# Patient Record
Sex: FEMALE | Race: BLACK OR AFRICAN AMERICAN | NOT HISPANIC OR LATINO | Employment: FULL TIME | URBAN - METROPOLITAN AREA
[De-identification: names, ages, dates, MRNs, and addresses within clinical notes are randomized per-mention and may not be internally consistent; named-entity substitution may affect disease eponyms.]

---

## 2023-10-19 ENCOUNTER — OFFICE VISIT (OUTPATIENT)
Dept: OBSTETRICS AND GYNECOLOGY | Age: 33
End: 2023-10-19
Payer: COMMERCIAL

## 2023-10-19 VITALS
BODY MASS INDEX: 30.91 KG/M2 | DIASTOLIC BLOOD PRESSURE: 70 MMHG | WEIGHT: 168 LBS | SYSTOLIC BLOOD PRESSURE: 110 MMHG | HEIGHT: 62 IN

## 2023-10-19 DIAGNOSIS — Z01.419 WELL WOMAN EXAM WITH ROUTINE GYNECOLOGICAL EXAM: Primary | ICD-10-CM

## 2023-10-19 DIAGNOSIS — Z12.4 SCREENING FOR CERVICAL CANCER: ICD-10-CM

## 2023-10-19 DIAGNOSIS — Z30.41 ORAL CONTRACEPTIVE PILL SURVEILLANCE: ICD-10-CM

## 2023-10-19 RX ORDER — NORETHINDRONE ACETATE AND ETHINYL ESTRADIOL 1.5-30(21)
1 KIT ORAL DAILY
Qty: 84 TABLET | Refills: 4 | Status: SHIPPED | OUTPATIENT
Start: 2023-10-19 | End: 2024-10-18

## 2023-10-19 NOTE — PROGRESS NOTES
Subjective     Chief Complaint   Patient presents with    Gynecologic Exam     New gyn, Annual, last pap 2022 normal  CC:  no problems       History of Present Illness    Vee Rodriguez is a 32 y.o.  who presents for annual exam.    New GYN  OCP's for contraception   Her menses are regular every 28-30 days, lasting 4-7 days, dysmenorrhea none   Soc - moved to [place] this year from [place]. Just needed change. 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  No other GYN concerns or complaints today    Obstetric History:  OB History          1    Para        Term                AB   1    Living             SAB        IAB   1    Ectopic        Molar        Multiple        Live Births                   Menstrual History:     Patient's last menstrual period was 10/05/2023.         Current contraception: OCP (estrogen/progesterone)  History of abnormal Pap smear: no  Received Gardasil immunization: no  Perform regular self breast exam: no  Family history of uterine or ovarian cancer: no  Family History of colon cancer: no  Family history of breast cancer: no    Mammogram: not indicated.  Colonoscopy: not indicated.  DEXA: not indicated.    Exercise: moderately active - weights, cardio   Calcium/Vitamin D: adequate intake    The following portions of the patient's history were reviewed and updated as appropriate: allergies, current medications, past family history, past medical history, past social history, past surgical history, and problem list.    Review of Systems   Constitutional: Negative.    Respiratory: Negative.     Cardiovascular: Negative.    Gastrointestinal: Negative.    Genitourinary: Negative.    Skin: Negative.    Psychiatric/Behavioral: Negative.             Objective   Physical Exam  Constitutional:       General: She is awake.      Appearance: Normal appearance. She is well-developed.   HENT:      Head: Normocephalic and atraumatic.      Nose: Nose normal.   Neck:       Thyroid: No thyroid mass, thyromegaly or thyroid tenderness.   Cardiovascular:      Rate and Rhythm: Normal rate and regular rhythm.      Pulses: Normal pulses.      Heart sounds: Normal heart sounds.   Pulmonary:      Effort: Pulmonary effort is normal.      Breath sounds: Normal breath sounds.   Chest:   Breasts:     Breasts are symmetrical.      Right: Normal. No swelling, bleeding, inverted nipple, mass, nipple discharge, skin change or tenderness.      Left: Normal. No swelling, bleeding, inverted nipple, mass, nipple discharge, skin change or tenderness.   Abdominal:      General: Abdomen is flat. Bowel sounds are normal.      Palpations: Abdomen is soft.      Tenderness: There is no abdominal tenderness.   Genitourinary:     General: Normal vulva.      Labia:         Right: No rash, tenderness, lesion or injury.         Left: No rash, tenderness, lesion or injury.       Urethra: No prolapse, urethral pain, urethral swelling or urethral lesion.      Vagina: Normal. No signs of injury. No vaginal discharge, erythema, tenderness, bleeding, lesions or prolapsed vaginal walls.      Cervix: Normal. No discharge, friability, lesion, erythema or cervical bleeding.      Uterus: Normal. Not enlarged, not tender and no uterine prolapse.       Adnexa: Right adnexa normal and left adnexa normal.        Right: No mass, tenderness or fullness.          Left: No mass, tenderness or fullness.        Rectum: Normal. No mass.   Musculoskeletal:      Cervical back: Normal range of motion and neck supple.   Lymphadenopathy:      Upper Body:      Right upper body: No supraclavicular adenopathy.      Left upper body: No supraclavicular adenopathy.   Skin:     General: Skin is warm and dry.   Neurological:      General: No focal deficit present.      Mental Status: She is alert and oriented to person, place, and time.   Psychiatric:         Mood and Affect: Mood normal.         Behavior: Behavior normal. Behavior is cooperative.     "     Thought Content: Thought content normal.         Judgment: Judgment normal.         /70   Ht 157.5 cm (62\")   Wt 76.2 kg (168 lb)   LMP 10/05/2023   BMI 30.73 kg/m²     Assessment & Plan   Diagnoses and all orders for this visit:    1. Well woman exam with routine gynecological exam (Primary)    2. Screening for cervical cancer  -     IGP, Apt HPV,rfx 16 / 18,45    3. Oral contraceptive pill surveillance  -     norethindrone-ethinyl estradiol-iron (Junel FE 1.5/30) 1.5-30 MG-MCG tablet; Take 1 tablet by mouth Daily.  Dispense: 84 tablet; Refill: 4        All questions answered.  Breast self exam technique reviewed and patient encouraged to perform self-exam monthly.  Discussed healthy lifestyle modifications.  Recommended 30 minutes of aerobic exercise five times per week.  Discussed calcium needs to prevent osteoporosis.    -Pap done  -OCP's refilled  -F/u 1 year               "

## 2023-10-23 LAB
CYTOLOGIST CVX/VAG CYTO: NORMAL
CYTOLOGY CVX/VAG DOC CYTO: NORMAL
CYTOLOGY CVX/VAG DOC THIN PREP: NORMAL
DX ICD CODE: NORMAL
HIV 1 & 2 AB SER-IMP: NORMAL
HPV I/H RISK 4 DNA CVX QL PROBE+SIG AMP: NEGATIVE
OTHER STN SPEC: NORMAL
STAT OF ADQ CVX/VAG CYTO-IMP: NORMAL

## 2024-11-15 ENCOUNTER — OFFICE VISIT (OUTPATIENT)
Dept: OBSTETRICS AND GYNECOLOGY | Age: 34
End: 2024-11-15
Payer: COMMERCIAL

## 2024-11-15 VITALS
BODY MASS INDEX: 30.91 KG/M2 | WEIGHT: 168 LBS | DIASTOLIC BLOOD PRESSURE: 64 MMHG | HEIGHT: 62 IN | SYSTOLIC BLOOD PRESSURE: 116 MMHG

## 2024-11-15 DIAGNOSIS — Z12.4 SCREENING FOR MALIGNANT NEOPLASM OF CERVIX: ICD-10-CM

## 2024-11-15 DIAGNOSIS — Z30.41 ORAL CONTRACEPTIVE PILL SURVEILLANCE: ICD-10-CM

## 2024-11-15 DIAGNOSIS — Z01.419 WELL FEMALE EXAM WITH ROUTINE GYNECOLOGICAL EXAM: Primary | ICD-10-CM

## 2024-11-15 DIAGNOSIS — Z11.51 SCREENING FOR HUMAN PAPILLOMAVIRUS (HPV): ICD-10-CM

## 2024-11-15 RX ORDER — NORETHINDRONE ACETATE AND ETHINYL ESTRADIOL 1.5-30(21)
1 KIT ORAL DAILY
Qty: 84 TABLET | Refills: 3 | Status: SHIPPED | OUTPATIENT
Start: 2024-11-15 | End: 2025-11-15

## 2024-11-15 RX ORDER — SERTRALINE HYDROCHLORIDE 100 MG/1
100 TABLET, FILM COATED ORAL
COMMUNITY
Start: 2024-11-11

## 2024-11-15 NOTE — PROGRESS NOTES
"Subjective     History of Present Illness    Chief Complaint   Patient presents with    Gynecologic Exam     AE Lst pap (-) Hpv (-) 10/19/23  Pt has no complaints today        Vee Rodriguez is a 33 y.o. female who presents for annual exam.  Menses are regular every 28-30 days, lasting 4-7 days, dysmenorrhea none     Doing well, no complaints.   On OCPs, likes these and requests refills. Regular menses.    Pap smear last year was normal/negative. No hx abnormal pap smear. She would like to continue with yearly pap smears for now.   Declines STD testing.   Social - Had a [place] wedding a few months ago and is having a big wedding in [place] in April. 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    Relevant data reviewed:  IGP, Apt HPV,rfx 16 / 18,45 (10/19/2023 09:56)     Obstetric History:  OB History          1    Para        Term                AB   1    Living             SAB        IAB   1    Ectopic        Molar        Multiple        Live Births                   Menstrual History:     Patient's last menstrual period was 2024 (exact date).           Current contraception: OCP (estrogen/progesterone)  History of abnormal Pap smear: no  Received Gardasil immunization: no  Perform regular self breast exam: no  Family history of uterine or ovarian cancer: no  Family History of colon cancer: no  Family history of breast cancer: no    PAP: done today  Mammogram: not indicated  Colonoscopy: not indicated  DEXA: not indicated    Exercise: moderately active  Calcium/Vitamin D: adequate intake    The following portions of the patient's history were reviewed and updated as appropriate: allergies, current medications, past family history, past medical history, past social history, past surgical history, and problem list.    Review of Systems  A comprehensive review of systems was negative.       Objective   Physical Exam    /64   Ht 157.5 cm (62.01\")   Wt 76.2 kg (168 lb)   LMP 2024 " (Exact Date)   BMI 30.72 kg/m²      General: alert, appears stated age, cooperative, and no distress   Heart: regular rate and rhythm, S1, S2 normal, no murmur, click, rub or gallop   Lungs: clear to auscultation bilaterally   Abdomen: soft, non-tender, without masses or organomegaly   Breast: inspection negative, no nipple discharge or bleeding, no masses or nodularity palpable   External genitalia/Vulva: External genitalia including bartholin's glands, Urethra, Hooper's gland and urethra meatus are normal and Bladder appears normal without significant prolapse    Vagina: normal mucosa, normal discharge   Cervix: no lesions   Uterus: normal size and non-tender   Adnexa: normal adnexa and no mass, fullness, tenderness   Neurologic: Alert and Oriented x3   Psychiatric: Normal affect, judgement, and mood       Assessment & Plan   Diagnoses and all orders for this visit:    1. Well female exam with routine gynecological exam (Primary)  -     IGP, Apt HPV,rfx 16 / 18,45    2. Screening for human papillomavirus (HPV)  -     IGP, Apt HPV,rfx 16 / 18,45    3. Screening for malignant neoplasm of cervix  -     IGP, Apt HPV,rfx 16 / 18,45    4. Oral contraceptive pill surveillance  -     norethindrone-ethinyl estradiol-iron (Junel FE 1.5/30) 1.5-30 MG-MCG tablet; Take 1 tablet by mouth Daily.  Dispense: 84 tablet; Refill: 3          PAP smear with HPV co-testing completed today  OCP refills sent  Will call patient with results and treat accordingly.   All questions answered.  Breast self exam technique reviewed and patient encouraged to perform self-exam monthly.  Physical activity and regular exercise encouraged.  Discussed healthy lifestyle modifications.  Discussed calcium and vitamin D needs to prevent osteoporosis.      Return in 1 year (on 11/15/2025) for Annual exam.

## 2024-11-24 LAB
CYTOLOGIST CVX/VAG CYTO: NORMAL
CYTOLOGY CVX/VAG DOC CYTO: NORMAL
CYTOLOGY CVX/VAG DOC THIN PREP: NORMAL
DX ICD CODE: NORMAL
HPV I/H RISK 4 DNA CVX QL PROBE+SIG AMP: NEGATIVE
Lab: NORMAL
OTHER STN SPEC: NORMAL
STAT OF ADQ CVX/VAG CYTO-IMP: NORMAL

## 2025-01-31 ENCOUNTER — LAB (OUTPATIENT)
Dept: OTHER | Facility: HOSPITAL | Age: 35
End: 2025-01-31
Payer: COMMERCIAL

## 2025-01-31 ENCOUNTER — CONSULT (OUTPATIENT)
Dept: ONCOLOGY | Facility: CLINIC | Age: 35
End: 2025-01-31
Payer: COMMERCIAL

## 2025-01-31 ENCOUNTER — PRIOR AUTHORIZATION (OUTPATIENT)
Dept: ONCOLOGY | Facility: CLINIC | Age: 35
End: 2025-01-31
Payer: COMMERCIAL

## 2025-01-31 ENCOUNTER — TELEPHONE (OUTPATIENT)
Dept: ONCOLOGY | Facility: CLINIC | Age: 35
End: 2025-01-31

## 2025-01-31 VITALS
HEIGHT: 62 IN | DIASTOLIC BLOOD PRESSURE: 78 MMHG | SYSTOLIC BLOOD PRESSURE: 115 MMHG | TEMPERATURE: 98 F | HEART RATE: 67 BPM | BODY MASS INDEX: 30.46 KG/M2 | WEIGHT: 165.5 LBS | RESPIRATION RATE: 16 BRPM | OXYGEN SATURATION: 98 %

## 2025-01-31 DIAGNOSIS — D75.839 THROMBOCYTOSIS: Primary | ICD-10-CM

## 2025-01-31 DIAGNOSIS — R71.8 MICROCYTOSIS: ICD-10-CM

## 2025-01-31 LAB
BASOPHILS # BLD AUTO: 0.04 10*3/MM3 (ref 0–0.2)
BASOPHILS NFR BLD AUTO: 0.6 % (ref 0–1.5)
DEPRECATED RDW RBC AUTO: 36.8 FL (ref 37–54)
EOSINOPHIL # BLD AUTO: 0.07 10*3/MM3 (ref 0–0.4)
EOSINOPHIL NFR BLD AUTO: 1.1 % (ref 0.3–6.2)
ERYTHROCYTE [DISTWIDTH] IN BLOOD BY AUTOMATED COUNT: 14.8 % (ref 12.3–15.4)
FERRITIN SERPL-MCNC: 72.1 NG/ML (ref 13–150)
HCT VFR BLD AUTO: 35.1 % (ref 34–46.6)
HGB BLD-MCNC: 11.5 G/DL (ref 12–15.9)
HGB RETIC QN AUTO: 25 PG (ref 29.8–36.1)
IMM GRANULOCYTES # BLD AUTO: 0.05 10*3/MM3 (ref 0–0.05)
IMM GRANULOCYTES NFR BLD AUTO: 0.8 % (ref 0–0.5)
IMM RETICS NFR: 15.1 % (ref 3–15.8)
IRON 24H UR-MRATE: 119 MCG/DL (ref 37–145)
IRON SATN MFR SERPL: 28 % (ref 20–50)
LYMPHOCYTES # BLD AUTO: 2.98 10*3/MM3 (ref 0.7–3.1)
LYMPHOCYTES NFR BLD AUTO: 44.9 % (ref 19.6–45.3)
MCH RBC QN AUTO: 23.2 PG (ref 26.6–33)
MCHC RBC AUTO-ENTMCNC: 32.8 G/DL (ref 31.5–35.7)
MCV RBC AUTO: 70.8 FL (ref 79–97)
MONOCYTES # BLD AUTO: 0.62 10*3/MM3 (ref 0.1–0.9)
MONOCYTES NFR BLD AUTO: 9.4 % (ref 5–12)
NEUTROPHILS NFR BLD AUTO: 2.87 10*3/MM3 (ref 1.7–7)
NEUTROPHILS NFR BLD AUTO: 43.2 % (ref 42.7–76)
NRBC BLD AUTO-RTO: 0 /100 WBC (ref 0–0.2)
PLAT MORPH BLD: NORMAL
PLATELET # BLD AUTO: 435 10*3/MM3 (ref 140–450)
PMV BLD AUTO: 8.5 FL (ref 6–12)
RBC # BLD AUTO: 4.96 10*6/MM3 (ref 3.77–5.28)
RBC MORPH BLD: NORMAL
RETICS # AUTO: 0.09 10*6/MM3 (ref 0.02–0.13)
RETICS/RBC NFR AUTO: 1.72 % (ref 0.7–1.9)
TIBC SERPL-MCNC: 432 MCG/DL (ref 298–536)
TRANSFERRIN SERPL-MCNC: 290 MG/DL (ref 200–360)
WBC MORPH BLD: NORMAL
WBC NRBC COR # BLD AUTO: 6.63 10*3/MM3 (ref 3.4–10.8)

## 2025-01-31 PROCEDURE — 82525 ASSAY OF COPPER: CPT | Performed by: INTERNAL MEDICINE

## 2025-01-31 PROCEDURE — 83540 ASSAY OF IRON: CPT | Performed by: INTERNAL MEDICINE

## 2025-01-31 PROCEDURE — 84466 ASSAY OF TRANSFERRIN: CPT | Performed by: INTERNAL MEDICINE

## 2025-01-31 PROCEDURE — 36415 COLL VENOUS BLD VENIPUNCTURE: CPT

## 2025-01-31 PROCEDURE — 83020 HEMOGLOBIN ELECTROPHORESIS: CPT | Performed by: INTERNAL MEDICINE

## 2025-01-31 PROCEDURE — 82728 ASSAY OF FERRITIN: CPT | Performed by: INTERNAL MEDICINE

## 2025-01-31 PROCEDURE — 85046 RETICYTE/HGB CONCENTRATE: CPT | Performed by: INTERNAL MEDICINE

## 2025-01-31 PROCEDURE — 85025 COMPLETE CBC W/AUTO DIFF WBC: CPT | Performed by: INTERNAL MEDICINE

## 2025-01-31 PROCEDURE — 81257 HBA1/HBA2 GENE: CPT | Performed by: INTERNAL MEDICINE

## 2025-01-31 PROCEDURE — 85007 BL SMEAR W/DIFF WBC COUNT: CPT | Performed by: INTERNAL MEDICINE

## 2025-01-31 PROCEDURE — 84630 ASSAY OF ZINC: CPT | Performed by: INTERNAL MEDICINE

## 2025-01-31 PROCEDURE — 83655 ASSAY OF LEAD: CPT | Performed by: INTERNAL MEDICINE

## 2025-01-31 RX ORDER — SPIRONOLACTONE 50 MG/1
1 TABLET, FILM COATED ORAL EVERY 12 HOURS SCHEDULED
COMMUNITY
Start: 2024-12-03

## 2025-01-31 RX ORDER — ADAPALENE GEL USP, 0.3% 3 MG/G
GEL TOPICAL
COMMUNITY
Start: 2024-12-05

## 2025-01-31 NOTE — PROGRESS NOTES
.     REASON FOR CONSULTATION:   Thrombocytosis  Provide an opinion on any further workup or treatment                             REQUESTING PHYSICIAN: MIKE Jeffers  RECORDS OBTAINED:  Records of the patient's history including those obtained from the referring provider were reviewed and summarized in detail.    HISTORY OF PRESENT ILLNESS:  The patient is a 34 y.o. year old female  who is here for follow-up with the above-mentioned history.    Reviewed records from PCP requesting an appointment due to elevated PLT.  10/14/24: WBC 6.1, Hb 11.8 (normal range 11.1-15.9), , MCV 73.  7/12/2024: Hb 11.4.  Normal range 11.1-15.9.  MCV 72.   (normal range 150-450).    7/22/2024: Iron saturation 33%.  No ferritin.    1/31/2025: Hb 11.5.  MCV 70.8.  .    States she took oral iron in the past when she donated blood frequently but did not have any follow-up labs to see if this helped.  Stopped donating blood and stopped the oral iron on her own after a while.  No GI side effects from the iron.    Menstruates regularly.  No bleeding otherwise.    Past Medical History:   Diagnosis Date    Anxiety     History of acne      No past surgical history on file.    MEDICATIONS    Current Outpatient Medications:     adapalene (DIFFERIN) 0.3 % gel, APPLY A PEA SIZED AMOUNT TO THE ENTIRE FACE AT BEDTIME, Disp: , Rfl:     norethindrone-ethinyl estradiol-iron (Junel FE 1.5/30) 1.5-30 MG-MCG tablet, Take 1 tablet by mouth Daily., Disp: 84 tablet, Rfl: 3    sertraline (ZOLOFT) 100 MG tablet, 1 tablet., Disp: , Rfl:     spironolactone (ALDACTONE) 50 MG tablet, Take 1 tablet by mouth Every 12 (Twelve) Hours., Disp: , Rfl:     ALLERGIES:     Allergies   Allergen Reactions    Predicort [Prednisolone] Hives       SOCIAL HISTORY:       Social History     Socioeconomic History    Marital status: Single   Tobacco Use    Smoking status: Never    Smokeless tobacco: Never   Vaping Use    Vaping status: Never Used  "  Substance and Sexual Activity    Alcohol use: Yes     Comment: soicially    Drug use: Never    Sexual activity: Yes     Partners: Male     Birth control/protection: Birth control pill         FAMILY HISTORY:  Family History   Problem Relation Age of Onset    Cancer Mother     Stroke Mother     Hypertension Father        REVIEW OF SYSTEMS:  Review of Systems   Constitutional:  Negative for activity change.   HENT:  Negative for nosebleeds and trouble swallowing.    Respiratory:  Negative for shortness of breath and wheezing.    Cardiovascular:  Negative for chest pain and palpitations.   Gastrointestinal:  Negative for constipation, diarrhea and nausea.   Genitourinary:  Negative for dysuria and hematuria.   Musculoskeletal:  Negative for arthralgias and myalgias.   Skin:  Negative for rash and wound.   Neurological:  Negative for seizures and syncope.   Hematological:  Negative for adenopathy. Does not bruise/bleed easily.   Psychiatric/Behavioral:  Negative for confusion.               Vitals:    01/31/25 1351   BP: 115/78   Pulse: 67   Resp: 16   Temp: 98 °F (36.7 °C)   TempSrc: Temporal   SpO2: 98%   Weight: 75.1 kg (165 lb 8 oz)   Height: 157 cm (61.81\")   PainSc: 0-No pain         1/31/2025     1:51 PM   Current Status   ECOG score 0      PHYSICAL EXAM:        CONSTITUTIONAL:  Vital signs reviewed.  No distress, looks comfortable.  EYES:  Conjunctiva and lids unremarkable.  PERRLA  EARS,NOSE,MOUTH,THROAT:  Ears and nose appear unremarkable.  Lips, teeth, gums appear unremarkable.  RESPIRATORY:  Normal respiratory effort.  Lungs clear to auscultation bilaterally.  CARDIOVASCULAR:  Normal S1, S2.  No murmurs rubs or gallops.  No significant lower extremity edema.  GASTROINTESTINAL: Abdomen appears unremarkable.  Nontender.  No hepatomegaly.  No splenomegaly.  LYMPHATIC:  No cervical, supraclavicular, axillary lymphadenopathy.  SKIN:  Warm.  No rashes.  PSYCHIATRIC:  Normal judgment and insight.  Normal mood and " affect.          RECENT LABS:        WBC   Date Value Ref Range Status   01/31/2025 6.63 3.40 - 10.80 10*3/mm3 Final     Hemoglobin   Date Value Ref Range Status   01/31/2025 11.5 (L) 12.0 - 15.9 g/dL Final     Platelets   Date Value Ref Range Status   01/31/2025 435 140 - 450 10*3/mm3 Final       Assessment & Plan   Thrombocytosis  - Ferritin  - Iron Profile  - Retic With IRF & RET-He  - BCR-ABL FISH  - JAK2 V617F Qual w/Reflex to CALR Mutation  - JAK2 V617F Qual w/Reflex to  Mutation  - Copper, Serum  - Zinc  - Lead, Blood  - Hemoglobinopathy Fractionation Cascade  - Alpha - Thalassemia    Microcytosis  - Ferritin  - Iron Profile  - Retic With IRF & RET-He  - BCR-ABL FISH  - JAK2 V617F Qual w/Reflex to CALR Mutation  - JAK2 V617F Qual w/Reflex to  Mutation  - Copper, Serum  - Zinc  - Lead, Blood  - Hemoglobinopathy Fractionation Cascade  - Alpha - Thalassemia        Vee Rodriguez   *Thrombocytosis  Outside labs:  on 7/12/2024, then 528 on 10/14/2024 (MCV was around 70.  Normal iron saturation, 33% on 7/22/2024 but ferritin not checked).  1/31/2025 (initial consult): .  Ferritin 72.1, 28% saturation, Hb 11.5.  Therefore, not iron deficient.    *Microcytosis  MCV low 70s July and October 2024 1/31/2025 (initial consult): Ferritin 72.1, 28% saturation, Hb 11.5.  Therefore, not iron deficient.  Has no children but is potentially interested in having children.  Therefore, check to see if she is a thalassemia carrier.    *Anemia  1/31/2025 (initial consult): Hb 11.5, newly anemic  (On July and October 2024 outside labs Hb in the 11's, but their normal range began at 11.1).  Check some anemia labs    Plan  FISH for BCR-ABL  JAK2 V617F with reflex to MPL and reflex to CALR  Alpha thalassemia gene test  Hemoglobin electrophoresis  Zinc, copper, lead  Appointment with me with CBC, B12, RBC folate in a few weeks and at that time will review the above    61 minutes.  Total time.  Same day.

## 2025-01-31 NOTE — TELEPHONE ENCOUNTER
----- Message from Mohamud Ross sent at 1/31/2025  3:56 PM EST -----  Radha Bull,   please call her and tell her that her iron labs are normal from today.  Therefore, I think we should check all of those other labs we talked about.  No need to begin oral iron.  Iron labs look normal.  It takes a long time to get the results back of all those labs that I am checking today.  Therefore, I will see her in a few weeks.    Airam, please arrange  2 unit appointment with me in roughly 3 weeks. I can see her on February 21 at noon, during lunch with a CBC that day. 2 units. 2 units. 2 units    Thanks!

## 2025-01-31 NOTE — TELEPHONE ENCOUNTER
No PA required for Fish [id number] per [employer]. No ref #. CPA out of network. Ok'd lab to send to [facility].    PA for Jak2 Mut 58038, CALR 29082, and MPL 13730 cannot be submitted till Fish results are back.

## 2025-02-01 LAB — LEAD BLDV-MCNC: <1 UG/DL (ref 0–3.4)

## 2025-02-03 ENCOUNTER — PATIENT ROUNDING (BHMG ONLY) (OUTPATIENT)
Dept: ONCOLOGY | Facility: CLINIC | Age: 35
End: 2025-02-03
Payer: COMMERCIAL

## 2025-02-03 LAB
HGB A MFR BLD ELPH: 91.2 % (ref 96.4–98.8)
HGB A2 MFR BLD ELPH: 5.4 % (ref 1.8–3.2)
HGB F MFR BLD ELPH: 3.4 % (ref 0–2)
HGB FRACT BLD-IMP: ABNORMAL
HGB S MFR BLD ELPH: 0 %
ZINC SERPL-MCNC: 65 UG/DL (ref 44–115)

## 2025-02-04 ENCOUNTER — DOCUMENTATION (OUTPATIENT)
Dept: ONCOLOGY | Facility: CLINIC | Age: 35
End: 2025-02-04
Payer: COMMERCIAL

## 2025-02-04 LAB — COPPER SERPL-MCNC: 176 UG/DL (ref 80–158)

## 2025-02-04 NOTE — PROGRESS NOTES
Aniket Field could you please call her if you do not mind and tell her that her copper is a little high.  Please review any ways to try to improve this     Aleah, could you please make sure she has a copper level at her next lab check/office visit with me    Thanks!

## 2025-02-05 DIAGNOSIS — R79.0 HIGH SERUM COPPER LEVEL: ICD-10-CM

## 2025-02-05 DIAGNOSIS — D75.839 THROMBOCYTOSIS: Primary | ICD-10-CM

## 2025-02-05 DIAGNOSIS — R71.8 MICROCYTOSIS: ICD-10-CM

## 2025-02-06 LAB — REF LAB TEST METHOD: NORMAL

## 2025-02-10 ENCOUNTER — SPECIALTY PHARMACY (OUTPATIENT)
Dept: PHARMACY | Facility: HOSPITAL | Age: 35
End: 2025-02-10
Payer: COMMERCIAL

## 2025-02-10 NOTE — PROGRESS NOTES
Specialty Pharmacy Patient Management Program  One-Time Clinical Outreach     Vee Rodriguez is seen by an their provider for elevated copper level and enrolled in the Oncology Patient Management program offered by [facility].      MT has called Ms Rodriguez and verified that she had been taking a multivitamin with minerals and stopped taking it less than 1 month ago.  We have scanned a list of foods with copper levels to her email.  She verbalized understanding of our discussion and that she will have copper level checked at her next office visit.    Rashida Willis Rph, [facility]  Clinical Specialty Pharmacist, Oncology  2/10/2025  13:16 EST

## 2025-02-11 ENCOUNTER — TELEPHONE (OUTPATIENT)
Dept: ONCOLOGY | Facility: CLINIC | Age: 35
End: 2025-02-11

## 2025-02-11 LAB — HBA1 GENE MUT ANL BLD/T: NORMAL

## 2025-02-11 NOTE — TELEPHONE ENCOUNTER
Caller: Vee Rodriguez    Relationship:  Self    Best call back number: [phone]    PATIENT CALLED REQUESTING TO CANCEL SAME DAY APPT.    Did the patient call AFTER the start time of their scheduled appointment?  []YES  [x]NO    Was the patient's appointment rescheduled? []YES  [x]NO    Any additional information: NEEDS A TUES, THURS OR FRI BEFORE NOON, PLEASE CALL PT TO R/S TODAY'S LAB.

## 2025-02-13 ENCOUNTER — LAB (OUTPATIENT)
Dept: OTHER | Facility: HOSPITAL | Age: 35
End: 2025-02-13
Payer: COMMERCIAL

## 2025-02-13 DIAGNOSIS — D75.839 THROMBOCYTOSIS: Primary | ICD-10-CM

## 2025-02-18 LAB — REF LAB TEST METHOD: NORMAL

## 2025-02-19 NOTE — PROGRESS NOTES
.     REASON FOR FOLLOWUP :   Thrombocytosis, beta thalassemia minor    HISTORY OF PRESENT ILLNESS:  The patient is a 34 y.o. year old female  who is here for follow-up with the above-mentioned history.    No new issues.  No bleeding problems.    Past Medical History:   Diagnosis Date    Anxiety     History of acne     Thrombocytosis      No past surgical history on file.    MEDICATIONS    Current Outpatient Medications:     adapalene (DIFFERIN) 0.3 % gel, APPLY A PEA SIZED AMOUNT TO THE ENTIRE FACE AT BEDTIME, Disp: , Rfl:     norethindrone-ethinyl estradiol-iron (Junel FE 1.5/30) 1.5-30 MG-MCG tablet, Take 1 tablet by mouth Daily., Disp: 84 tablet, Rfl: 3    sertraline (ZOLOFT) 100 MG tablet, 1 tablet., Disp: , Rfl:     spironolactone (ALDACTONE) 50 MG tablet, Take 1 tablet by mouth Every 12 (Twelve) Hours., Disp: , Rfl:     ALLERGIES:     Allergies   Allergen Reactions    Predicort [Prednisolone] Hives       SOCIAL HISTORY:       Social History     Socioeconomic History    Marital status: Single   Tobacco Use    Smoking status: Never    Smokeless tobacco: Never   Vaping Use    Vaping status: Never Used   Substance and Sexual Activity    Alcohol use: Yes     Comment: soicially    Drug use: Never    Sexual activity: Yes     Partners: Male     Birth control/protection: Birth control pill         FAMILY HISTORY:  Family History   Problem Relation Age of Onset    Cancer Mother     Stroke Mother     Hypertension Father        REVIEW OF SYSTEMS:  Review of Systems   Constitutional:  Negative for activity change.   HENT:  Negative for nosebleeds and trouble swallowing.    Respiratory:  Negative for shortness of breath and wheezing.    Cardiovascular:  Negative for chest pain and palpitations.   Gastrointestinal:  Negative for constipation, diarrhea and nausea.   Genitourinary:  Negative for dysuria and hematuria.   Musculoskeletal:  Negative for arthralgias and myalgias.   Skin:  Negative for rash and wound.  "  Neurological:  Negative for seizures and syncope.   Hematological:  Negative for adenopathy. Does not bruise/bleed easily.   Psychiatric/Behavioral:  Negative for confusion.               Vitals:    03/03/25 1010   BP: 112/77   Pulse: 70   Resp: 16   Temp: 97.8 °F (36.6 °C)   TempSrc: Oral   SpO2: 98%   Weight: 75.3 kg (166 lb)   Height: 157 cm (61.81\")   PainSc: 0-No pain           3/3/2025    10:11 AM   Current Status   ECOG score 0      PHYSICAL EXAM:        CONSTITUTIONAL:  Vital signs reviewed.  No distress, looks comfortable.  EYES:  Conjunctiva and lids unremarkable.  PERRLA  EARS,NOSE,MOUTH,THROAT:  Ears and nose appear unremarkable.  Lips, teeth, gums appear unremarkable.  RESPIRATORY:  Normal respiratory effort.  Lungs clear to auscultation bilaterally.  CARDIOVASCULAR:  Normal S1, S2.  No murmurs rubs or gallops.  No significant lower extremity edema.  GASTROINTESTINAL: Abdomen appears unremarkable.  Nontender.  No hepatomegaly.  No splenomegaly.  LYMPHATIC:  No cervical, supraclavicular, axillary lymphadenopathy.  SKIN:  Warm.  No rashes.  PSYCHIATRIC:  Normal judgment and insight.  Normal mood and affect.        RECENT LABS:        WBC   Date Value Ref Range Status   03/03/2025 4.25 3.40 - 10.80 10*3/mm3 Final   01/31/2025 6.63 3.40 - 10.80 10*3/mm3 Final     Hemoglobin   Date Value Ref Range Status   03/03/2025 11.6 (L) 12.0 - 15.9 g/dL Final   01/31/2025 11.5 (L) 12.0 - 15.9 g/dL Final     Platelets   Date Value Ref Range Status   03/03/2025 493 (H) 140 - 450 10*3/mm3 Final   01/31/2025 435 140 - 450 10*3/mm3 Final       Assessment & Plan   There are no diagnoses linked to this encounter.        Vee Michael   *Thrombocytosis (no mutational evidence of ET.  Has not had a marrow)  Outside labs:  on 7/12/2024, then 528 on 10/14/2024 (MCV was around 70.  Normal iron saturation, 33% on 7/22/2024 but ferritin not checked).  1/31/2025 (initial consult): .  Ferritin 72.1, 28% saturation, Hb " 11.5.  Therefore, not iron deficient.  Unremarkable: Thai 2 V617F, FISH for BCR-ABL, MPL, CALR  3/3/2025: .  I explained no mutational evidence of essential thrombocythemia.  I explained about 10 to 15% of ET's mutation negative.  This would require a bone marrow biopsy for further evaluation.  No history of MI, CVA, TIA, PE, DVT.  Therefore, even if DVT was diagnosed, the most we would do until age 60 is aspirin 81 mg daily.  No history of bleeding problems.  Offered a bone marrow biopsy now but I told her my suggestion would be taking aspirin 81 mg daily and following with me annually and around age 58 if plt still high, then doing a bone marrow biopsy for consideration of Hydrea.  Patient and  agree.    *Microcytosis, due to beta thalassemia minor  MCV low 70s July and October 2024 1/31/2025 (initial consult): Ferritin 72.1, 28% saturation, Hb 11.5.  Therefore, not iron deficient.  Has no children but is potentially interested in having children.  Therefore, check to see if she is a thalassemia carrier.  Unremarkable: Iron labs, zinc, lead, alpha thalassemia gene testing    *Beta thalassemia minor (AKA beta thalassemia trait)  1/31/2025 hemoglobin electrophoresis consistent with beta thalassemia minor  This often causes mild microcytic anemia.  Hb typically 10-12   Anemia can worsen in times of stress including infection, pregnancy, folate deficiency.  During times of stress, should supplement with folate.  important to consider testing of father if she considers pregnancy.   is aware if they consider having a child he should have a hemoglobin electrophoresis and alpha thalassemia genetic testing    *Copper elevated  1/31/2025 (initial value): Copper 176 (normal range up to 158).  3/3/2025: Check copper again.    *Anemia, suspect due to beta thalassemia minor  1/31/2025 (initial consult): Hb 11.5, newly anemic  (On July and October 2024 outside labs Hb in the 11's, but their normal range  began at 11.1).  1/31/2025, unremarkable: Iron labs, zinc  B12 and folate not checked with previous labs.  3/3/2025 B12 and folate pending    Plan  MD CBC 1 year  Begin aspirin 81 mg daily.  Okay to hold this for 7 days before any procedure.  Okay to stop aspirin if this becomes intolerable for some reason

## 2025-02-24 LAB — CALR EXON 9 MUT ANL BLD/T: NORMAL

## 2025-02-28 LAB — REF LAB TEST METHOD: NORMAL

## 2025-03-03 ENCOUNTER — OFFICE VISIT (OUTPATIENT)
Dept: ONCOLOGY | Facility: CLINIC | Age: 35
End: 2025-03-03
Payer: COMMERCIAL

## 2025-03-03 ENCOUNTER — LAB (OUTPATIENT)
Dept: LAB | Facility: HOSPITAL | Age: 35
End: 2025-03-03
Payer: COMMERCIAL

## 2025-03-03 VITALS
HEIGHT: 62 IN | HEART RATE: 70 BPM | DIASTOLIC BLOOD PRESSURE: 77 MMHG | BODY MASS INDEX: 30.55 KG/M2 | WEIGHT: 166 LBS | TEMPERATURE: 97.8 F | RESPIRATION RATE: 16 BRPM | SYSTOLIC BLOOD PRESSURE: 112 MMHG | OXYGEN SATURATION: 98 %

## 2025-03-03 DIAGNOSIS — R71.8 MICROCYTOSIS: ICD-10-CM

## 2025-03-03 DIAGNOSIS — D75.839 THROMBOCYTOSIS: ICD-10-CM

## 2025-03-03 DIAGNOSIS — D75.839 THROMBOCYTOSIS: Primary | ICD-10-CM

## 2025-03-03 DIAGNOSIS — R79.0 HIGH SERUM COPPER LEVEL: ICD-10-CM

## 2025-03-03 LAB
BASOPHILS # BLD AUTO: 0.02 10*3/MM3 (ref 0–0.2)
BASOPHILS NFR BLD AUTO: 0.5 % (ref 0–1.5)
DEPRECATED RDW RBC AUTO: 39.9 FL (ref 37–54)
EOSINOPHIL # BLD AUTO: 0.06 10*3/MM3 (ref 0–0.4)
EOSINOPHIL NFR BLD AUTO: 1.4 % (ref 0.3–6.2)
ERYTHROCYTE [DISTWIDTH] IN BLOOD BY AUTOMATED COUNT: 15.4 % (ref 12.3–15.4)
FOLATE SERPL-MCNC: 12.6 NG/ML (ref 4.78–24.2)
HCT VFR BLD AUTO: 37.8 % (ref 34–46.6)
HGB BLD-MCNC: 11.6 G/DL (ref 12–15.9)
IMM GRANULOCYTES # BLD AUTO: 0.01 10*3/MM3 (ref 0–0.05)
IMM GRANULOCYTES NFR BLD AUTO: 0.2 % (ref 0–0.5)
LYMPHOCYTES # BLD AUTO: 1.77 10*3/MM3 (ref 0.7–3.1)
LYMPHOCYTES NFR BLD AUTO: 41.6 % (ref 19.6–45.3)
MCH RBC QN AUTO: 22.6 PG (ref 26.6–33)
MCHC RBC AUTO-ENTMCNC: 30.7 G/DL (ref 31.5–35.7)
MCV RBC AUTO: 73.5 FL (ref 79–97)
MONOCYTES # BLD AUTO: 0.39 10*3/MM3 (ref 0.1–0.9)
MONOCYTES NFR BLD AUTO: 9.2 % (ref 5–12)
NEUTROPHILS NFR BLD AUTO: 2 10*3/MM3 (ref 1.7–7)
NEUTROPHILS NFR BLD AUTO: 47.1 % (ref 42.7–76)
NRBC BLD AUTO-RTO: 0 /100 WBC (ref 0–0.2)
PLATELET # BLD AUTO: 493 10*3/MM3 (ref 140–450)
PMV BLD AUTO: 8.6 FL (ref 6–12)
RBC # BLD AUTO: 5.14 10*6/MM3 (ref 3.77–5.28)
VIT B12 BLD-MCNC: 381 PG/ML (ref 211–946)
WBC NRBC COR # BLD AUTO: 4.25 10*3/MM3 (ref 3.4–10.8)

## 2025-03-03 PROCEDURE — 85025 COMPLETE CBC W/AUTO DIFF WBC: CPT

## 2025-03-03 PROCEDURE — 82746 ASSAY OF FOLIC ACID SERUM: CPT | Performed by: INTERNAL MEDICINE

## 2025-03-03 PROCEDURE — 82607 VITAMIN B-12: CPT | Performed by: INTERNAL MEDICINE

## 2025-03-03 PROCEDURE — 99214 OFFICE O/P EST MOD 30 MIN: CPT | Performed by: INTERNAL MEDICINE

## 2025-03-04 LAB — REF LAB TEST METHOD: NORMAL

## 2025-03-08 LAB — COPPER SERPL-MCNC: 165 UG/DL (ref 80–158)

## 2025-03-10 ENCOUNTER — DOCUMENTATION (OUTPATIENT)
Dept: ONCOLOGY | Facility: CLINIC | Age: 35
End: 2025-03-10
Payer: COMMERCIAL

## 2025-03-10 NOTE — PROGRESS NOTES
Rashida, can you please call her and tell her that her copper level is better but it is still mildly elevated.  Please arrange for her to have another copper level in about 3 months and please make sure she is on no copper supplementation and please add any other thoughts you may have.  Thank you

## 2025-03-11 ENCOUNTER — SPECIALTY PHARMACY (OUTPATIENT)
Dept: PHARMACY | Facility: HOSPITAL | Age: 35
End: 2025-03-11
Payer: COMMERCIAL

## 2025-03-11 DIAGNOSIS — R79.0 HIGH SERUM COPPER LEVEL: ICD-10-CM

## 2025-03-11 DIAGNOSIS — D75.839 THROMBOCYTOSIS: Primary | ICD-10-CM

## 2025-03-11 NOTE — PROGRESS NOTES
Specialty Note-     Code, MD Alba Barrientos II, [place], [facility]  Rashida, can you please call her and tell her that her copper level is better but it is still mildly elevated.  Please arrange for her to have another copper level in about 3 months and please make sure she is on no copper supplementation and please add any other thoughts you may have.  Thank you    Called Ms Rodriguez and left VM suggesting that she continue eating the foods we discussed on last call and well as incorporating weekly exercise, as well as  supplementation of Zinc 50 mg po daily and Vitamin C supplement.  Left direct line [phone] should she have further questions.

## 2025-06-09 ENCOUNTER — LAB (OUTPATIENT)
Dept: LAB | Facility: HOSPITAL | Age: 35
End: 2025-06-09
Payer: COMMERCIAL

## 2025-06-09 DIAGNOSIS — D75.839 THROMBOCYTOSIS: ICD-10-CM

## 2025-06-09 DIAGNOSIS — R79.0 HIGH SERUM COPPER LEVEL: ICD-10-CM

## 2025-06-09 PROCEDURE — 36415 COLL VENOUS BLD VENIPUNCTURE: CPT

## 2025-06-12 LAB — COPPER SERPL-MCNC: 167 UG/DL (ref 80–158)

## 2025-06-22 ENCOUNTER — DOCUMENTATION (OUTPATIENT)
Dept: ONCOLOGY | Facility: CLINIC | Age: 35
End: 2025-06-22
Payer: COMMERCIAL

## 2025-06-22 NOTE — PROGRESS NOTES
Aniket Field, can you please call her and tell her that her copper level is better but it is still mildly elevated.  Please arrange for her to have another copper level in about 3 months and please make sure she is on no copper supplementation and please add any other thoughts you may have.  Thank you    Thanks!    Ayanna Willis, formerly [facility]  Code, Mohamud ESCOBAR II, MD; Kinza Shoemaker RN  Good Morning,    I have called and spoken with her.  I suggested that she try a zinc/vitamin c supplement for 90 days since zinc competes with copper for absorption,  and keep up the other good things we had previously discussed such as diet.      Kamini can you please schedule her labs in 3 months.    Thanks,  Rashida

## 2025-06-23 ENCOUNTER — SPECIALTY PHARMACY (OUTPATIENT)
Dept: PHARMACY | Facility: HOSPITAL | Age: 35
End: 2025-06-23
Payer: COMMERCIAL

## 2025-06-23 ENCOUNTER — TELEPHONE (OUTPATIENT)
Dept: ONCOLOGY | Facility: CLINIC | Age: 35
End: 2025-06-23
Payer: COMMERCIAL

## 2025-06-23 DIAGNOSIS — R79.0 HIGH SERUM COPPER LEVEL: ICD-10-CM

## 2025-06-23 DIAGNOSIS — D75.839 THROMBOCYTOSIS: Primary | ICD-10-CM

## 2025-06-23 NOTE — PROGRESS NOTES
Ross, Mohamud ESCOBAR II, MD  P [facility] Oral Onc Pool  Aniket Field, can you please call her and tell her that her copper level is better but it is still mildly elevated.  Please arrange for her to have another copper level in about 3 months and please make sure she is on no copper supplementation and please add any other thoughts you may have.  Thank you    Thanks!    Called Ms Rodriguez and advised her of copper elevation.  We again discussed the addition of a zinc and vitamin c supplement- suggested [employer].  We have discussed dietary recommendations in the past.